# Patient Record
Sex: MALE | Race: WHITE
[De-identification: names, ages, dates, MRNs, and addresses within clinical notes are randomized per-mention and may not be internally consistent; named-entity substitution may affect disease eponyms.]

---

## 2021-01-29 ENCOUNTER — TRANSCRIPTION ENCOUNTER (OUTPATIENT)
Age: 36
End: 2021-01-29

## 2021-01-29 ENCOUNTER — OUTPATIENT (OUTPATIENT)
Dept: OUTPATIENT SERVICES | Facility: HOSPITAL | Age: 36
LOS: 1 days | End: 2021-01-29
Payer: COMMERCIAL

## 2021-01-29 DIAGNOSIS — Z20.828 CONTACT WITH AND (SUSPECTED) EXPOSURE TO OTHER VIRAL COMMUNICABLE DISEASES: ICD-10-CM

## 2021-01-29 LAB — SARS-COV-2 RNA SPEC QL NAA+PROBE: DETECTED

## 2021-01-29 PROCEDURE — C9803: CPT

## 2021-01-29 PROCEDURE — U0003: CPT

## 2021-01-29 PROCEDURE — U0005: CPT

## 2021-01-30 DIAGNOSIS — Z20.828 CONTACT WITH AND (SUSPECTED) EXPOSURE TO OTHER VIRAL COMMUNICABLE DISEASES: ICD-10-CM

## 2021-01-31 ENCOUNTER — EMERGENCY (EMERGENCY)
Facility: HOSPITAL | Age: 36
LOS: 0 days | Discharge: HOME | End: 2021-01-31
Attending: EMERGENCY MEDICINE
Payer: COMMERCIAL

## 2021-01-31 VITALS
DIASTOLIC BLOOD PRESSURE: 88 MMHG | SYSTOLIC BLOOD PRESSURE: 122 MMHG | RESPIRATION RATE: 18 BRPM | HEART RATE: 98 BPM | TEMPERATURE: 99 F | OXYGEN SATURATION: 97 %

## 2021-01-31 VITALS — HEIGHT: 73 IN | WEIGHT: 259.93 LBS

## 2021-01-31 DIAGNOSIS — U07.1 COVID-19: ICD-10-CM

## 2021-01-31 DIAGNOSIS — R06.02 SHORTNESS OF BREATH: ICD-10-CM

## 2021-01-31 DIAGNOSIS — M79.10 MYALGIA, UNSPECIFIED SITE: ICD-10-CM

## 2021-01-31 DIAGNOSIS — R51.9 HEADACHE, UNSPECIFIED: ICD-10-CM

## 2021-01-31 DIAGNOSIS — R50.9 FEVER, UNSPECIFIED: ICD-10-CM

## 2021-01-31 LAB
ALBUMIN SERPL ELPH-MCNC: 4.1 G/DL — SIGNIFICANT CHANGE UP (ref 3.3–5)
ALP SERPL-CCNC: 77 U/L — SIGNIFICANT CHANGE UP (ref 40–120)
ALT FLD-CCNC: 27 U/L — SIGNIFICANT CHANGE UP (ref 12–78)
ANION GAP SERPL CALC-SCNC: 5 MMOL/L — SIGNIFICANT CHANGE UP (ref 5–17)
AST SERPL-CCNC: 14 U/L — LOW (ref 15–37)
BASOPHILS # BLD AUTO: 0 K/UL — SIGNIFICANT CHANGE UP (ref 0–0.2)
BASOPHILS NFR BLD AUTO: 0 % — SIGNIFICANT CHANGE UP (ref 0–2)
BILIRUB SERPL-MCNC: 0.2 MG/DL — SIGNIFICANT CHANGE UP (ref 0.2–1.2)
BUN SERPL-MCNC: 13 MG/DL — SIGNIFICANT CHANGE UP (ref 7–23)
CALCIUM SERPL-MCNC: 9.1 MG/DL — SIGNIFICANT CHANGE UP (ref 8.5–10.1)
CHLORIDE SERPL-SCNC: 106 MMOL/L — SIGNIFICANT CHANGE UP (ref 96–108)
CO2 SERPL-SCNC: 29 MMOL/L — SIGNIFICANT CHANGE UP (ref 22–31)
CREAT SERPL-MCNC: 1.23 MG/DL — SIGNIFICANT CHANGE UP (ref 0.5–1.3)
D DIMER BLD IA.RAPID-MCNC: <150 NG/ML DDU — SIGNIFICANT CHANGE UP
EOSINOPHIL # BLD AUTO: 0 K/UL — SIGNIFICANT CHANGE UP (ref 0–0.5)
EOSINOPHIL NFR BLD AUTO: 0 % — SIGNIFICANT CHANGE UP (ref 0–6)
GLUCOSE SERPL-MCNC: 81 MG/DL — SIGNIFICANT CHANGE UP (ref 70–99)
HCT VFR BLD CALC: 46.5 % — SIGNIFICANT CHANGE UP (ref 39–50)
HGB BLD-MCNC: 15.4 G/DL — SIGNIFICANT CHANGE UP (ref 13–17)
LYMPHOCYTES # BLD AUTO: 1.1 K/UL — SIGNIFICANT CHANGE UP (ref 1–3.3)
LYMPHOCYTES # BLD AUTO: 32 % — SIGNIFICANT CHANGE UP (ref 13–44)
MCHC RBC-ENTMCNC: 29.3 PG — SIGNIFICANT CHANGE UP (ref 27–34)
MCHC RBC-ENTMCNC: 33.1 GM/DL — SIGNIFICANT CHANGE UP (ref 32–36)
MCV RBC AUTO: 88.6 FL — SIGNIFICANT CHANGE UP (ref 80–100)
MONOCYTES # BLD AUTO: 0.41 K/UL — SIGNIFICANT CHANGE UP (ref 0–0.9)
MONOCYTES NFR BLD AUTO: 12 % — SIGNIFICANT CHANGE UP (ref 2–14)
NEUTROPHILS # BLD AUTO: 1.9 K/UL — SIGNIFICANT CHANGE UP (ref 1.8–7.4)
NEUTROPHILS NFR BLD AUTO: 55 % — SIGNIFICANT CHANGE UP (ref 43–77)
NRBC # BLD: SIGNIFICANT CHANGE UP /100 WBCS (ref 0–0)
PLATELET # BLD AUTO: 208 K/UL — SIGNIFICANT CHANGE UP (ref 150–400)
POTASSIUM SERPL-MCNC: 4.2 MMOL/L — SIGNIFICANT CHANGE UP (ref 3.5–5.3)
POTASSIUM SERPL-SCNC: 4.2 MMOL/L — SIGNIFICANT CHANGE UP (ref 3.5–5.3)
PROT SERPL-MCNC: 7.4 GM/DL — SIGNIFICANT CHANGE UP (ref 6–8.3)
RBC # BLD: 5.25 M/UL — SIGNIFICANT CHANGE UP (ref 4.2–5.8)
RBC # FLD: 13.3 % — SIGNIFICANT CHANGE UP (ref 10.3–14.5)
SODIUM SERPL-SCNC: 140 MMOL/L — SIGNIFICANT CHANGE UP (ref 135–145)
TROPONIN I SERPL-MCNC: <0.015 NG/ML — SIGNIFICANT CHANGE UP (ref 0.01–0.04)
WBC # BLD: 3.45 K/UL — LOW (ref 3.8–10.5)
WBC # FLD AUTO: 3.45 K/UL — LOW (ref 3.8–10.5)

## 2021-01-31 PROCEDURE — 36415 COLL VENOUS BLD VENIPUNCTURE: CPT

## 2021-01-31 PROCEDURE — 99283 EMERGENCY DEPT VISIT LOW MDM: CPT

## 2021-01-31 PROCEDURE — 71045 X-RAY EXAM CHEST 1 VIEW: CPT

## 2021-01-31 PROCEDURE — 93005 ELECTROCARDIOGRAM TRACING: CPT

## 2021-01-31 PROCEDURE — 71045 X-RAY EXAM CHEST 1 VIEW: CPT | Mod: 26

## 2021-01-31 PROCEDURE — 80053 COMPREHEN METABOLIC PANEL: CPT

## 2021-01-31 PROCEDURE — 99284 EMERGENCY DEPT VISIT MOD MDM: CPT | Mod: 25

## 2021-01-31 PROCEDURE — 84484 ASSAY OF TROPONIN QUANT: CPT

## 2021-01-31 PROCEDURE — 85379 FIBRIN DEGRADATION QUANT: CPT

## 2021-01-31 PROCEDURE — 93010 ELECTROCARDIOGRAM REPORT: CPT

## 2021-01-31 PROCEDURE — 96374 THER/PROPH/DIAG INJ IV PUSH: CPT

## 2021-01-31 PROCEDURE — 85025 COMPLETE CBC W/AUTO DIFF WBC: CPT

## 2021-01-31 PROCEDURE — 96375 TX/PRO/DX INJ NEW DRUG ADDON: CPT

## 2021-01-31 RX ORDER — SODIUM CHLORIDE 9 MG/ML
500 INJECTION INTRAMUSCULAR; INTRAVENOUS; SUBCUTANEOUS ONCE
Refills: 0 | Status: COMPLETED | OUTPATIENT
Start: 2021-01-31 | End: 2021-01-31

## 2021-01-31 RX ORDER — KETOROLAC TROMETHAMINE 30 MG/ML
30 SYRINGE (ML) INJECTION ONCE
Refills: 0 | Status: DISCONTINUED | OUTPATIENT
Start: 2021-01-31 | End: 2021-01-31

## 2021-01-31 RX ORDER — METOCLOPRAMIDE HCL 10 MG
10 TABLET ORAL ONCE
Refills: 0 | Status: COMPLETED | OUTPATIENT
Start: 2021-01-31 | End: 2021-01-31

## 2021-01-31 RX ADMIN — Medication 30 MILLIGRAM(S): at 16:53

## 2021-01-31 RX ADMIN — Medication 10 MILLIGRAM(S): at 16:55

## 2021-01-31 RX ADMIN — SODIUM CHLORIDE 500 MILLILITER(S): 9 INJECTION INTRAMUSCULAR; INTRAVENOUS; SUBCUTANEOUS at 16:53

## 2021-01-31 NOTE — ED ADULT NURSE NOTE - CHIEF COMPLAINT QUOTE
Has Your Skin Lesion Been Treated?: not been treated
Is This A New Presentation, Or A Follow-Up?: Growth
PT C/O SOB, SEVERE BACK PAIN, MILD CHEST PAIN, FEVER, TESTED POSITIVE FOR COVID 1/29.  89 F.  95%RA

## 2021-01-31 NOTE — ED STATDOCS - PHYSICAL EXAMINATION
Constitutional: NAD AAOx3  Eyes: PERRLA EOMI  Head: Normocephalic atraumatic  Mouth: MMM  Cardiac: tachycardic rate   Resp: Lungs CTAB, ambulatory o2 95% on RA.   GI: Abd s/nt/nd  Neuro: CN2-12 intact, normal strength sensation coordination and gait  Skin: No rashes Constitutional: mild distress AAOx3  Eyes: PERRLA EOMI  Head: Normocephalic atraumatic  Mouth: MMM  Cardiac: tachycardic rate   Resp: Lungs CTAB, ambulatory o2 95% on RA.   GI: Abd s/nt/nd  Neuro: CN2-12 intact, normal strength sensation coordination and gait  Skin: No rashes

## 2021-01-31 NOTE — ED STATDOCS - NS_ ATTENDINGSCRIBEDETAILS _ED_A_ED_FT
I, Brain Feng MD,  performed the initial face to face bedside interview with this patient regarding history of present illness, review of symptoms and relevant past medical, social and family history.  I completed an independent physical examination.  I was the initial provider who evaluated this patient.  The history, relevant review of systems, past medical and surgical history, medical decision making, and physical examination was documented by the scribe in my presence and I attest to the accuracy of the documentation.

## 2021-01-31 NOTE — ED STATDOCS - CLINICAL SUMMARY MEDICAL DECISION MAKING FREE TEXT BOX
35 M COVID + presents with back pain, SOB. pt here is tachycardic and mildly hypoxic. Sx likely related to COVID infection. obtain EKG, CXR, dimer to r/o PE. sx control and reassess.

## 2021-01-31 NOTE — ED ADULT TRIAGE NOTE - CHIEF COMPLAINT QUOTE
PT C/O SOB, SEVERE BACK PAIN, MILD CHEST PAIN, FEVER, TESTED POSITIVE FOR COVID 1/29.  89 F.  95%RA

## 2021-01-31 NOTE — ED STATDOCS - NS ED ROS FT
Constitutional: No fever or chills  Eyes: No visual changes  HEENT: No throat pain  CV: No chest pain  Resp: +SOB no cough  GI: No abd pain, nausea or vomiting  : No dysuria  MSK: +back pain, +myalgia, +arthralgia   Skin: No rash  Neuro: +headache Constitutional: +fever  Eyes: No visual changes  HEENT: No throat pain  CV: No chest pain  Resp: +SOB no cough  GI: No abd pain, nausea or vomiting  : No dysuria  MSK: +back pain, +myalgia, +arthralgia   Skin: No rash  Neuro: +headache

## 2021-01-31 NOTE — ED STATDOCS - CARE PLAN
Principal Discharge DX:	Headache  Secondary Diagnosis:	Shortness of breath  Secondary Diagnosis:	Myalgia  Secondary Diagnosis:	COVID-19

## 2021-01-31 NOTE — ED STATDOCS - NSFOLLOWUPINSTRUCTIONS_ED_ALL_ED_FT
1. return for worsening symptoms or anything concerning to you  2. take all home meds as prescribed  3. follow up with your pmd call to make an appointment  4. see attached instructions for pulseox and covid

## 2021-01-31 NOTE — ED STATDOCS - OBJECTIVE STATEMENT
34 y/o male presents to the ED with SOB, was dx with COVID-19 on 01/29/21 since then with severe myalgia, arthralgia, back pain, SOB, HA. Tried Tylenol without help. +fevers at home to 102F. +loss of smell and taste. No vomiting or diarrhea. Wife also is ill. Sx moderate constant and non radiating.

## 2021-01-31 NOTE — ED STATDOCS - PROGRESS NOTE DETAILS
36 y/o M presents to the ED with SOB, was dx with COVID-19 on 01/29/21 since then with myalgias, back pain, SOB, HA. Tried Tylenol w/o relief, pt reports fevers at home tmax 102F, loss of smell and taste. No vomiting or diarrhea. Sx moderate constant and non radiating.  PE: lungs CTA b/l, heart RRR s1/s2, abd soft, non-tender, non-distended, pharynx w/o erythema or exudates O2 sat 95%  Plan: EKG, CXR, labs, dimer, reassess  Fiona Barrera PA-C pt feeling better. trop dimer xray unremarkable. pt ambulatory o2 96% RA. will d/c with follow up and strict return precautions.  Brain Feng M.D., Attending Physician

## 2021-01-31 NOTE — ED ADULT NURSE NOTE - OBJECTIVE STATEMENT
Pt c/o SOB, back pain, headache,  intermittent mild CP.  Pt dx with COVID-19 on 01/29/21. Pt c/o worsening myalgia, pt report loss of smell and taste. Pt denies N/V/D. No diaphoresis noted. No respiratory distress.

## 2021-01-31 NOTE — ED STATDOCS - PATIENT PORTAL LINK FT
You can access the FollowMyHealth Patient Portal offered by [facility] by registering at the following website: [email or web address]. By joining [employer]’s FollowMyHealth portal, you will also be able to view your health information using other applications (apps) compatible with our system.

## 2021-02-01 ENCOUNTER — TRANSCRIPTION ENCOUNTER (OUTPATIENT)
Age: 36
End: 2021-02-01

## 2021-02-02 ENCOUNTER — TRANSCRIPTION ENCOUNTER (OUTPATIENT)
Age: 36
End: 2021-02-02

## 2021-02-03 ENCOUNTER — TRANSCRIPTION ENCOUNTER (OUTPATIENT)
Age: 36
End: 2021-02-03

## 2021-02-13 ENCOUNTER — OUTPATIENT (OUTPATIENT)
Dept: OUTPATIENT SERVICES | Facility: HOSPITAL | Age: 36
LOS: 1 days | End: 2021-02-13
Payer: COMMERCIAL

## 2021-02-13 DIAGNOSIS — Z20.828 CONTACT WITH AND (SUSPECTED) EXPOSURE TO OTHER VIRAL COMMUNICABLE DISEASES: ICD-10-CM

## 2021-02-13 LAB — SARS-COV-2 RNA SPEC QL NAA+PROBE: DETECTED

## 2021-02-13 PROCEDURE — U0005: CPT

## 2021-02-13 PROCEDURE — U0003: CPT

## 2021-02-13 PROCEDURE — C9803: CPT

## 2021-02-14 DIAGNOSIS — Z20.828 CONTACT WITH AND (SUSPECTED) EXPOSURE TO OTHER VIRAL COMMUNICABLE DISEASES: ICD-10-CM

## 2021-04-30 ENCOUNTER — OUTPATIENT (OUTPATIENT)
Dept: OUTPATIENT SERVICES | Facility: HOSPITAL | Age: 36
LOS: 1 days | End: 2021-04-30
Payer: COMMERCIAL

## 2021-04-30 DIAGNOSIS — Z20.828 CONTACT WITH AND (SUSPECTED) EXPOSURE TO OTHER VIRAL COMMUNICABLE DISEASES: ICD-10-CM

## 2021-04-30 LAB — SARS-COV-2 RNA SPEC QL NAA+PROBE: SIGNIFICANT CHANGE UP

## 2021-04-30 PROCEDURE — C9803: CPT

## 2021-04-30 PROCEDURE — U0005: CPT

## 2021-04-30 PROCEDURE — U0003: CPT

## 2021-05-01 DIAGNOSIS — Z20.828 CONTACT WITH AND (SUSPECTED) EXPOSURE TO OTHER VIRAL COMMUNICABLE DISEASES: ICD-10-CM

## 2021-05-10 ENCOUNTER — OUTPATIENT (OUTPATIENT)
Dept: OUTPATIENT SERVICES | Facility: HOSPITAL | Age: 36
LOS: 1 days | End: 2021-05-10
Payer: COMMERCIAL

## 2021-05-10 DIAGNOSIS — Z20.828 CONTACT WITH AND (SUSPECTED) EXPOSURE TO OTHER VIRAL COMMUNICABLE DISEASES: ICD-10-CM

## 2021-05-10 LAB — SARS-COV-2 RNA SPEC QL NAA+PROBE: SIGNIFICANT CHANGE UP

## 2021-05-10 PROCEDURE — C9803: CPT

## 2021-05-10 PROCEDURE — U0003: CPT

## 2021-05-10 PROCEDURE — U0005: CPT

## 2021-05-11 DIAGNOSIS — Z20.828 CONTACT WITH AND (SUSPECTED) EXPOSURE TO OTHER VIRAL COMMUNICABLE DISEASES: ICD-10-CM

## 2021-05-21 ENCOUNTER — OUTPATIENT (OUTPATIENT)
Dept: OUTPATIENT SERVICES | Facility: HOSPITAL | Age: 36
LOS: 1 days | End: 2021-05-21
Payer: COMMERCIAL

## 2021-05-21 DIAGNOSIS — Z20.828 CONTACT WITH AND (SUSPECTED) EXPOSURE TO OTHER VIRAL COMMUNICABLE DISEASES: ICD-10-CM

## 2021-05-21 LAB — SARS-COV-2 RNA SPEC QL NAA+PROBE: SIGNIFICANT CHANGE UP

## 2021-05-21 PROCEDURE — C9803: CPT

## 2021-05-21 PROCEDURE — U0005: CPT

## 2021-05-21 PROCEDURE — U0003: CPT

## 2021-05-22 DIAGNOSIS — Z20.828 CONTACT WITH AND (SUSPECTED) EXPOSURE TO OTHER VIRAL COMMUNICABLE DISEASES: ICD-10-CM

## 2021-05-24 ENCOUNTER — RESULT REVIEW (OUTPATIENT)
Age: 36
End: 2021-05-24

## 2021-07-27 ENCOUNTER — EMERGENCY (EMERGENCY)
Facility: HOSPITAL | Age: 36
LOS: 0 days | Discharge: HOME | End: 2021-07-27
Attending: EMERGENCY MEDICINE
Payer: COMMERCIAL

## 2021-07-27 VITALS
HEART RATE: 89 BPM | TEMPERATURE: 98 F | DIASTOLIC BLOOD PRESSURE: 93 MMHG | SYSTOLIC BLOOD PRESSURE: 128 MMHG | RESPIRATION RATE: 16 BRPM | OXYGEN SATURATION: 99 %

## 2021-07-27 VITALS — WEIGHT: 255.07 LBS | HEIGHT: 73 IN

## 2021-07-27 DIAGNOSIS — E86.0 DEHYDRATION: ICD-10-CM

## 2021-07-27 DIAGNOSIS — F45.21 HYPOCHONDRIASIS: ICD-10-CM

## 2021-07-27 LAB
ADD ON TEST-SPECIMEN IN LAB: SIGNIFICANT CHANGE UP
ALBUMIN SERPL ELPH-MCNC: 5.4 G/DL — HIGH (ref 3.3–5)
ALP SERPL-CCNC: 85 U/L — SIGNIFICANT CHANGE UP (ref 40–120)
ALT FLD-CCNC: 32 U/L — SIGNIFICANT CHANGE UP (ref 12–78)
ANION GAP SERPL CALC-SCNC: 7 MMOL/L — SIGNIFICANT CHANGE UP (ref 5–17)
AST SERPL-CCNC: 15 U/L — SIGNIFICANT CHANGE UP (ref 15–37)
BILIRUB SERPL-MCNC: 0.6 MG/DL — SIGNIFICANT CHANGE UP (ref 0.2–1.2)
BUN SERPL-MCNC: 24 MG/DL — HIGH (ref 7–23)
CALCIUM SERPL-MCNC: 10 MG/DL — SIGNIFICANT CHANGE UP (ref 8.5–10.1)
CHLORIDE SERPL-SCNC: 98 MMOL/L — SIGNIFICANT CHANGE UP (ref 96–108)
CO2 SERPL-SCNC: 27 MMOL/L — SIGNIFICANT CHANGE UP (ref 22–31)
CREAT SERPL-MCNC: 1.59 MG/DL — HIGH (ref 0.5–1.3)
GLUCOSE SERPL-MCNC: 94 MG/DL — SIGNIFICANT CHANGE UP (ref 70–99)
POTASSIUM SERPL-MCNC: 4.1 MMOL/L — SIGNIFICANT CHANGE UP (ref 3.5–5.3)
POTASSIUM SERPL-SCNC: 4.1 MMOL/L — SIGNIFICANT CHANGE UP (ref 3.5–5.3)
PROT SERPL-MCNC: 8.8 GM/DL — HIGH (ref 6–8.3)
SODIUM SERPL-SCNC: 132 MMOL/L — LOW (ref 135–145)

## 2021-07-27 PROCEDURE — 99283 EMERGENCY DEPT VISIT LOW MDM: CPT

## 2021-07-27 PROCEDURE — 99284 EMERGENCY DEPT VISIT MOD MDM: CPT

## 2021-07-27 PROCEDURE — 36415 COLL VENOUS BLD VENIPUNCTURE: CPT

## 2021-07-27 PROCEDURE — 82550 ASSAY OF CK (CPK): CPT

## 2021-07-27 PROCEDURE — 80053 COMPREHEN METABOLIC PANEL: CPT

## 2021-07-27 RX ORDER — SODIUM CHLORIDE 9 MG/ML
2000 INJECTION INTRAMUSCULAR; INTRAVENOUS; SUBCUTANEOUS ONCE
Refills: 0 | Status: COMPLETED | OUTPATIENT
Start: 2021-07-27 | End: 2021-07-27

## 2021-07-27 RX ADMIN — SODIUM CHLORIDE 4000 MILLILITER(S): 9 INJECTION INTRAMUSCULAR; INTRAVENOUS; SUBCUTANEOUS at 17:16

## 2021-07-27 NOTE — ED STATDOCS - PROGRESS NOTE DETAILS
pt with symptoms resolvedmainor Da Silva DO signed Anay Horvath PA-C Pt seen initially in intake by Dr. Da Silva.   35M with PM hyperhidrosis c/o extreme weakness today. 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 pt feeling much better after fluids in ED. Labs notable for mild hyponatremia and elevated renal function. Pt to f/u with PMD Lisa to repeat labs. return precautions given. Pt feeling well at DC, agrees with DC and plan of care.

## 2021-07-27 NOTE — ED STATDOCS - CPE ED EYE NORM
Josette care of patient awake and alert in bed. Denies any bleeding, leakage of fluid, or decreased fetal movement. PIV to L hand in fusing LR and Pitocin as ordered. No s/s of infiltration or infection noted. See flowsheet for charting. Will call Dr. Billie Gonzales for further orders. bilateral normal...

## 2021-07-27 NOTE — ED STATDOCS - ATTENDING CONTRIBUTION TO CARE
Dr. Da Silva: I performed a face to face bedside interview with patient regarding history of present illness, review of symptoms and past medical history. I completed an independent physical exam.  I have discussed patient's plan of care with PA.   I agree with note as stated above, having amended the EMR as needed to reflect my findings.   This includes HISTORY OF PRESENT ILLNESS, HIV, PAST MEDICAL/SURGICAL/FAMILY/SOCIAL HISTORY, ALLERGIES AND HOME MEDICATIONS, REVIEW OF SYSTEMS, PHYSICAL EXAM, and any PROGRESS NOTES during the time I functioned as the attending physician for this patient.

## 2021-07-27 NOTE — ED ADULT NURSE NOTE - OBJECTIVE STATEMENT
Patient presents to ED complaining of dehydration. Patient states he was at work became dizzy, patient states whole body is intermittently craqmping. 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 Patient denies dizziness on arrival, denies CP, SOB, NVD, HA

## 2021-07-27 NOTE — ED STATDOCS - OBJECTIVE STATEMENT
31 y/o M with a PMHx of hypochondriasis presents to the ED c/o dehydration. 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 Pt drank water and Gatorade but is very dehydrated and experiencing cramping.

## 2022-01-09 ENCOUNTER — OUTPATIENT (OUTPATIENT)
Dept: OUTPATIENT SERVICES | Facility: HOSPITAL | Age: 37
LOS: 1 days | End: 2022-01-09
Payer: COMMERCIAL

## 2022-01-09 DIAGNOSIS — Z20.828 CONTACT WITH AND (SUSPECTED) EXPOSURE TO OTHER VIRAL COMMUNICABLE DISEASES: ICD-10-CM

## 2022-01-09 LAB — SARS-COV-2 RNA SPEC QL NAA+PROBE: SIGNIFICANT CHANGE UP

## 2022-01-09 PROCEDURE — U0005: CPT

## 2022-01-09 PROCEDURE — U0003: CPT

## 2022-01-09 PROCEDURE — C9803: CPT

## 2022-01-10 DIAGNOSIS — Z20.828 CONTACT WITH AND (SUSPECTED) EXPOSURE TO OTHER VIRAL COMMUNICABLE DISEASES: ICD-10-CM

## 2022-12-01 ENCOUNTER — APPOINTMENT (OUTPATIENT)
Dept: NEUROLOGY | Facility: CLINIC | Age: 37
End: 2022-12-01

## 2023-01-03 NOTE — ED ADULT TRIAGE NOTE - BSA (M2)
NOTIFICATION OF INPATIENT ADMISSION   AUTHORIZATION REQUEST   SERVICING FACILITY:   [address]  Tax ID: [id number]  NPI: [id number]   ATTENDING PROVIDER:  Attending Name and NPI#: Shiv Foss Do [[id number]]  Address: [address] [place] [address] [place]  [address]  Phone: [phone]     ADMISSION INFORMATION:  Place of Service: Inpatient [address] [facility]  [address]  Place of Service Code: 21  Inpatient Admission Date/Time: 12/31/22  2:57 PM  Discharge Date/Time: No discharge date for patient encounter  Admitting Diagnosis Code/Description:  UTI (urinary tract infection) [N39 0]  Sepsis ([address] ) [A41 9]  AMS (altered mental status) [R41 82]     UTILIZATION REVIEW CONTACT:  [facility] [place] Utilization   Network Utilization Review Department  Phone: [phone]  Fax: [phone]  Email: [email or web address]  Contact for approvals/pending authorizations, clinical reviews, and discharge  PHYSICIAN ADVISORY SERVICES:  Medical Necessity Denial & Rm[facility]io-[facility] Review  Phone: [phone]  Fax: [phone]  Email: [email or web address]           Initial Clinical Review    Admission: Date/Time/Statement:   Admission Orders (From admission, onward)     Ordered        12/31/22 Ziyad Adam  Once                      Orders Placed This Encounter   Procedures   • INPATIENT ADMISSION     Standing Status:   Standing     Number of Occurrences:   1     Order Specific Question:   Level of Care     Answer:   Med Surg [16]     Order Specific Question:   Estimated length of stay     Answer:   More than 2 Midnights     Order Specific Question:   Certification     Answer:   I certify that inpatient services are medically necessary for this patient for a duration of greater than two midnights  See H&P and MD Progress Notes for additional information about the patient's course of treatment       ED Arrival Information     Expected   - Arrival   12/31/2022 11:42    Acuity   Emergent            Means of arrival   Ambulance    Escorted by   [facility]    Service   Hospitalist    Admission type   Emergency            Arrival complaint   EMS/AMS           Chief Complaint   Patient presents with   • Altered Mental Status     Per EMS, caregiver states more confused than normal today, smells like UTI  Febrile  Pt has no complaints except headache  Left sided weakness and speech at baseline       Initial Presentation: 67 y o  male PMH Parkinson's disease, neuropathy, with left-sided weakness, BPH, urinary incontinence, hypertension, hyperlipidemia, coronary artery disease, type 2 diabetes to ED by EMS presents w AMS  Wife reports incr sleepiness, less interactive w mild HA, Temp 99 5F    EXAM  Febrile, tachycardia; reports dysuria, mild ABD pain; oriented x3; labs leukocytosis, elevated LA, abn UA, CT reveals renal 11 mm indeterminate left upper pole hypodensity, colonic diverticulosis  Intertrigo at groin  Given 2 L IVF bolus     Inpatient admission due to Urosepsis, toxic metabolic encephalopathy  Cont gentle IV hydration, IV antibx, follow blood/ urine CXs  Avoid hallucinogenic agents; fall/ delirium precautions  Nystatin powder to groin, serial exam  Cont home meds  Date:  1/1/2023   Day 2:   Attending MD  Encephalopathy resolved   Cont antibx, follow urine/ blood CXs, AM CBC, PT/OT eval , SSI  ED Triage Vitals [12/31/22 1147]   Temperature Pulse Respirations Blood Pressure SpO2   98 9 °F (37 2 °C) (!) 108 16 133/59 99 %      Temp Source Heart Rate Source Patient Position - Orthostatic VS BP Location FiO2 (%)   Oral Monitor Lying Right arm --      Pain Score       No Pain          Wt Readings from Last 1 Encounters:   01/03/23 85 9 kg (189 lb 6 oz)     Additional Vital Signs:   Date/Time Temp Pulse Resp BP MAP (mmHg) SpO2 O2 Device Patient Position - Orthostatic VS   01/01/23 14:50:18 99 2 °F (37 3 °C) 89 -- 97/51 66 95 % -- --   01/01/23 09:40:20 -- 97 20 115/64 81 95 % -- --   01/01/23 08:03:02 99 1 °F (37 3 °C) 98 16 102/67 79 93 % -- --   12/31/22 2215 -- -- -- 168/78 -- -- -- Lying   12/31/22 22:12:11 99 1 °F (37 3 °C) 118 Abnormal  18 165/127 Abnormal  140 94 % None (Room air) Lying   12/31/22 2009 -- -- -- -- -- -- None (Room air) --   12/31/22 17:37:20 98 2 °F (36 8 °C) 87 16 131/71 91 96 % None (Room air) Lying   12/31/22 1630 -- 83 18 108/53 76 94 % None (Room air) Lying   12/31/22 1545 100 °F (37 8 °C) 91 -- 115/59 83 96 % -- --   12/31/22 1530 -- 89 -- 111/53 77 96 % -- --   12/31/22 1500 -- 91 -- 103/54 74 95 % -- --   12/31/22 1445 -- 94 -- 103/53 74 96 % -- --   12/31/22 1430 -- 91 -- 97/54 73 95 % -- --   12/31/22 1415 -- 97 -- 97/54 72 96 % -- --   12/31/22 1345 -- 103 -- -- -- 96 % None (Room air) --   12/31/22 1330 -- 102 -- 132/60 87 97 % None (Room air) --   12/31/22 1315 -- 104 -- 138/62 89 96 % -- --   12/31/22 1300 -- 105 18 160/61 101 98 % -- --   12/31/22 1245 -- 107 Abnormal  -- -- -- 98 % -- --   12/31/22 1203 101 4 °F (38 6 °C) Abnormal  -- -- -- -- -- -- --   12/31/22 1200 -- 103 23 Abnormal  139/65 -- 96 % None (Room air) --   12/31/22 1147 98 9 °F (37 2 °C) 108 Abnormal  16 133/59 -- 99 % None (Room air) Lying     Weights (last 14 days)    Date/Time Weight Weight Method   01/01/23 0548 81 4 kg (179 lb 7 3 oz) Bed scale   12/31/22 1831 81 3 kg (179 lb 3 7 oz) Bed scale   12/31/22 17:37:20 81 3 kg (179 lb 3 7 oz) Bed scale   12/31/22 1200 76 8 kg (169 lb 5 oz) Bed scale   12/31/22 1147 76 4 kg (168 lb 6 9 oz) Stretcher scale     Trauma Secondary Assessment - Perry Coma Scale    Date and Time Eye Opening Best Verbal Response Best Motor Response Perry Coma Scale Score   01/01/23 0402 4 5 6 15   12/31/22 2009 4 5 6 15   12/31/22 1223 4 5 6 15       Pertinent Labs/Diagnostic Test Results:   CT abdomen pelvis with contrast   Final Result by Andres Peck DO (12/31 2849)      1  No acute intra-abdominal abnormality        2  11 mm indeterminate left upper pole hypodensity versus volume averaging with prominent renal pyramid  Follow-up CT abdomen with renal protocol recommended in 6 months to reevaluate this finding  Small bilateral simple cysts and nonobstructing left    renal calculi noted  3  Colonic diverticulosis without evidence of diverticulitis  Additional incidental findings as above          Results from last 7 days   Lab Units 12/31/22  1215   SARS-COV-2  Negative     Results from last 7 days   Lab Units 01/02/23  0534 01/01/23  0509 12/31/22 2002 12/31/22  1214   WBC Thousand/uL 8 30 9 44  --  13 58*   HEMOGLOBIN g/dL 9 0* 10 1*  --  12 1   HEMATOCRIT % 28 0* 31 3*  --  36 9   PLATELETS Thousands/uL 181 211 206 241   NEUTROS ABS Thousands/µL  --  8 17*  --  12 37*         Results from last 7 days   Lab Units 01/02/23  0534 01/01/23  0509 12/31/22  1214   SODIUM mmol/L 137 136 136   POTASSIUM mmol/L 3 8 3 8 4 2   CHLORIDE mmol/L 107 103 100   CO2 mmol/L 25 24 24   ANION GAP mmol/L 5 9 12   BUN mg/dL 19 19 23   CREATININE mg/dL 1 33* 1 30 1 29   EGFR ml/min/1 73sq m 53 54 55   CALCIUM mg/dL 7 7* 8 0* 9 0   MAGNESIUM mg/dL  --  1 3*  --      Results from last 7 days   Lab Units 01/01/23  0509 12/31/22  1214   AST U/L 15 12*   ALT U/L 5* 3*   ALK PHOS U/L 64 80   TOTAL PROTEIN g/dL 5 7* 6 7   ALBUMIN g/dL 3 2* 3 8   TOTAL BILIRUBIN mg/dL 1 09* 1 20*     Results from last 7 days   Lab Units 01/03/23  0747 01/02/23  2120 01/02/23  1617 01/02/23  1158 01/02/23  0752 01/01/23  2050 01/01/23  1546 01/01/23  1138 01/01/23  0801 12/31/22  2152   POC GLUCOSE mg/dl 174* 197* 202* 237* 152* 261* 174* 233* 143* 213*     Results from last 7 days   Lab Units 01/02/23  0534 01/01/23  0509 12/31/22  1214   GLUCOSE RANDOM mg/dL 170* 159* 156*             No results found for: BETA-HYDROXYBUTYRATE                   Results from last 7 days   Lab Units 12/31/22  1643 12/31/22  1406 12/31/22  1214   HS TNI 0HR ng/L  --   --  12   HS TNI 2HR ng/L  --  8  --    HSTNI D2 ng/L  --  -4  --    HS TNI 4HR ng/L 13  --   --    HSTNI D4 ng/L 1  --   --          Results from last 7 days   Lab Units 12/31/22  1214   PROTIME seconds 14 0   INR  1 05   PTT seconds 28     Results from last 7 days   Lab Units 12/31/22 2002   TSH 3RD GENERATON uIU/mL 0 720     Results from last 7 days   Lab Units 01/01/23  0509 12/31/22  1214   PROCALCITONIN ng/ml 0 23 0 19     Results from last 7 days   Lab Units 12/31/22  1534 12/31/22  1214   LACTIC ACID mmol/L 1 4 2 7*                                         Results from last 7 days   Lab Units 12/31/22  1217   CLARITY UA  Turbid   COLOR UA  Light Yellow   SPEC GRAV UA  1 017   PH UA  5 0   GLUCOSE UA mg/dl Negative   KETONES UA mg/dl Negative   BLOOD UA  Large*   PROTEIN UA mg/dl Trace*   NITRITE UA  Negative   BILIRUBIN UA  Negative   UROBILINOGEN UA (BE) mg/dl <2 0   LEUKOCYTES UA  Large*   WBC UA /hpf Innumerable*   RBC UA /hpf 30-50*   BACTERIA UA /hpf None Seen   EPITHELIAL CELLS WET PREP /hpf Occasional     Results from last 7 days   Lab Units 12/31/22  1215   INFLUENZA A PCR  Negative   INFLUENZA B PCR  Negative   RSV PCR  Negative                             Results from last 7 days   Lab Units 12/31/22  1229 12/31/22  1217 12/31/22  1214   BLOOD CULTURE  No Growth at 48 hrs  --  No Growth at 48 hrs     URINE CULTURE   --  70,000-79,000 cfu/ml Staphylococcus aureus*  10,000-19,000 cfu/ml  --        ED Treatment:   Medication Administration from 12/31/2022 1142 to 12/31/2022 1731       Date/Time Order Dose Route Action     12/31/2022 1239 EST acetaminophen (TYLENOL) tablet 975 mg 975 mg Oral Given     12/31/2022 1215 EST sodium chloride 0 9 % bolus 1,000 mL 1,000 mL Intravenous New Bag     12/31/2022 1319 EST ceftriaxone (ROCEPHIN) 1 g/50 mL in dextrose IVPB 1,000 mg Intravenous New Bag     12/31/2022 1321 EST sodium chloride 0 9 % bolus 1,000 mL 1,000 mL Intravenous New Bag        Past Medical History: Diagnosis Date   • Ambulatory dysfunction     uses walker with assist of 1   • Anemia    • Anxiety    • At risk for falls     hx of falls   • Benign paroxysmal vertigo     unspecified ear   • BPH (benign prostatic hyperplasia)     for TURP today 1/4/2021   • Calculus in bladder     for surgical removal today 1/4/2021   • Cataract     left eye   • Cervicalgia    • Chest pain    • Chronic kidney disease     stage 3   • Constipation    • CTS (carpal tunnel syndrome)     left   • Cyst of pancreas    • Cystitis 06/23/2020    acute cystitis with hematuria   • Depression    • Diabetes mellitus ([place] [address] )     type II with neuropathy   • Dizziness     and giddiness   • Dysphagia    • Elevated PSA    • Facial weakness    • GERD (gastroesophageal reflux disease)     last assessed 5/20/16   • Gross hematuria    • Hematuria    • Hyperlipidemia    • Hypertension    • Kidney disease    • Kidney stone    • Left-sided weakness    • Long term (current) use of oral hypoglycemic drugs    • Muscle weakness    • Nuclear senile cataract of left eye    • OA (osteoarthritis) of knee     b/l   • Paralytic syndrome (HCC)    • Syncope and collapse    • Tachycardia    • UTI (urinary tract infection)    • Vertebro-basilar artery syndrome    • Vitamin B deficiency    • Vitamin D deficiency    • Vitamin D deficiency      Present on Admission:  • Stage 3 chronic kidney disease ([place] [address] )  • Type 2 diabetes mellitus with diabetic neuropathy (HCC)  • Left-sided weakness      Admitting Diagnosis: UTI (urinary tract infection) [N39 0]  Sepsis ([place] [address] ) [A41 9]  AMS (altered mental status) [R41 82]  Age/Sex: 67 y o  male  Admission Orders:  Daily wt  I/O  SCD  Scheduled Medications:  aspirin, 81 mg, Oral, Daily  atorvastatin, 40 mg, Oral, Daily  carbidopa-levodopa, 1 tablet, Oral, TID  cefTRIAXone, 1,000 mg, Intravenous, Q24H  clopidogrel, 75 mg, Oral, Daily  cyanocobalamin, 1,000 mcg, Oral, Daily  docusate sodium, 100 mg, Oral, BID  finasteride, 5 mg, Oral, Daily  glycerin-hypromellose-, 1 drop, Both Eyes, BID  heparin (porcine), 5,000 Units, Subcutaneous, Q8H LIYAH  insulin lispro, 1-6 Units, Subcutaneous, HS  insulin lispro, 1-6 Units, Subcutaneous, TID AC  lisinopril, 5 mg, Oral, Daily  melatonin, 3 mg, Oral, HS  metoprolol succinate, 25 mg, Oral, Daily  nystatin, , Topical, BID  pantoprazole, 40 mg, Oral, Early Morning  ranolazine, 500 mg, Oral, Q12H LIYAH  tamsulosin, 0 4 mg, Oral, Daily With Dinner      Continuous IV Infusions:  sodium chloride, 75 mL/hr, Intravenous, Continuous      PRN Meds:  acetaminophen, 650 mg, Oral, Q6H PRN            Network Utilization Review Department  ATTENTION: Please call with any questions or concerns to [phone] and carefully listen to the prompts so that you are directed to the right person  All voicemails are confidential   [facility] all requests for admission clinical reviews, approved or denied determinations and any other requests to dedicated fax number below belonging to the campus where the patient is receiving treatment   List of dedicated fax numbers for the Facilities:  [address] DENIALS (Administrative/Medical Necessity) [phone]   [address] (Maternity/NICU/Pediatrics) [phone]   [address] Lindsey Simons [phone]   [facility] [address] [phone]   [address] [place] [address] Kenyon Wagner 28 [phone]   [facility]6 [facility] Kathleen Ponce [facility] 134 [address] [facility] [phone] 2.41

## 2023-01-11 ENCOUNTER — APPOINTMENT (OUTPATIENT)
Dept: NEUROLOGY | Facility: CLINIC | Age: 38
End: 2023-01-11

## 2023-01-24 ENCOUNTER — APPOINTMENT (OUTPATIENT)
Dept: NEUROLOGY | Facility: CLINIC | Age: 38
End: 2023-01-24

## 2023-02-15 ENCOUNTER — APPOINTMENT (OUTPATIENT)
Dept: NEUROLOGY | Facility: CLINIC | Age: 38
End: 2023-02-15

## 2023-11-25 ENCOUNTER — EMERGENCY (EMERGENCY)
Facility: HOSPITAL | Age: 38
LOS: 0 days | Discharge: HOME | End: 2023-11-25
Attending: STUDENT IN AN ORGANIZED HEALTH CARE EDUCATION/TRAINING PROGRAM
Payer: COMMERCIAL

## 2023-11-25 VITALS — HEIGHT: 73 IN | WEIGHT: 250 LBS

## 2023-11-25 VITALS
OXYGEN SATURATION: 99 % | RESPIRATION RATE: 18 BRPM | SYSTOLIC BLOOD PRESSURE: 135 MMHG | HEART RATE: 69 BPM | DIASTOLIC BLOOD PRESSURE: 87 MMHG | TEMPERATURE: 98 F

## 2023-11-25 DIAGNOSIS — M54.2 CERVICALGIA: ICD-10-CM

## 2023-11-25 PROCEDURE — 99284 EMERGENCY DEPT VISIT MOD MDM: CPT

## 2023-11-25 PROCEDURE — 99283 EMERGENCY DEPT VISIT LOW MDM: CPT

## 2023-11-25 RX ORDER — DIAZEPAM 5 MG
2 TABLET ORAL
Qty: 2 | Refills: 0
Start: 2023-11-25 | End: 2023-11-25

## 2023-11-25 RX ORDER — LIDOCAINE 4 G/100G
1 CREAM TOPICAL ONCE
Refills: 0 | Status: COMPLETED | OUTPATIENT
Start: 2023-11-25 | End: 2023-11-25

## 2023-11-25 RX ORDER — IBUPROFEN 200 MG
600 TABLET ORAL ONCE
Refills: 0 | Status: COMPLETED | OUTPATIENT
Start: 2023-11-25 | End: 2023-11-25

## 2023-11-25 RX ADMIN — LIDOCAINE 1 PATCH: 4 CREAM TOPICAL at 07:02

## 2023-11-25 RX ADMIN — Medication 600 MILLIGRAM(S): at 07:03

## 2023-11-25 NOTE — ED PROVIDER NOTE - NSFOLLOWUPINSTRUCTIONS_ED_ALL_ED_FT
** Ibuprofen 600mg every 6 hours, take with food.   ** Salonpas patches, heat, massage may also help.     ** Follow up with your primary care doctor in the next 72 hours.     ** Go to the nearest Emergency Department if you experience any new or concerning symptoms, such as:   - worsening pain, pain in the middle of your neck, numbness or weakness in your arms and legs.   - unable to move or feel part of your body  - fever, chills  - severe headache

## 2023-11-25 NOTE — ED PROVIDER NOTE - PATIENT PORTAL LINK FT
You can access the FollowMyHealth Patient Portal offered by [facility] by registering at the following website: [email or web address]. By joining [employer]’s FollowMyHealth portal, you will also be able to view your health information using other applications (apps) compatible with our system.

## 2023-11-25 NOTE — ED PROVIDER NOTE - OBJECTIVE STATEMENT
37-year-old male no segment past medical history presents with neck pain.  Several days ago pain was on the right paraspinal neck, yesterday got better and then became left paraspinal neck.  Patient denies trauma, instrumentation, fevers chills, headache, lower extremity weakness, arm weakness, loss of bowel or bladder control.

## 2023-11-25 NOTE — ED ADULT NURSE NOTE - CHIEF COMPLAINT QUOTE
Pt ambulatory to ED c/o neck pain x1 week. 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 NKDA.

## 2023-11-25 NOTE — ED PROVIDER NOTE - CLINICAL SUMMARY MEDICAL DECISION MAKING FREE TEXT BOX
37-year-old male no segment past medical history presents with neck pain.  Several days ago pain was on the right paraspinal neck, yesterday got better and then became left paraspinal neck.  Patient denies trauma, instrumentation, fevers chills, headache, lower extremity weakness, arm weakness, loss of bowel or bladder control.  On exam, tenderness to palpation in the left paraspinal neck.  No midline tenderness of the cervical, thoracic, lumbar spine.  Patient ambulatory with no lower extremity weakness.    Differential includes, but not limited to: Torticollis, muscle spasm, very low suspicion for cord compression, C-spine injury.  Reassurance analgesia DC.

## 2023-11-25 NOTE — ED PROVIDER NOTE - PHYSICAL EXAMINATION
Gen: Well appearing in NAD   Head: NC/AT  Neck: trachea midline  Resp:  No distress  Ext: no deformities  Neck: + tenderness to palpation in the left paraspinal neck.  No midline tenderness of the cervical, thoracic, lumbar spine.  Patient ambulatory with no lower extremity weakness.  Neuro:  A&O appears non focal  Skin:  Warm and dry as visualized  Psych:  Normal affect and mood

## 2024-03-18 NOTE — ED ADULT NURSE NOTE - MODE OF DISCHARGE
CARE PROVIDERS:  Accepting Physician: Hill Brizuela  Administration: Joey Walsh  Administration: Stephany Mcgee  Administration: Venu Estrada  Administration: Luis Carlos Main  Administration: Garland Ko  Admitting: Hill Brizuela  Attending: Hill Brizuela  Case Management: Ashley Owen  Consultant: Ryan Romeo  Consultant: Juvenitno Soto  Consultant: Weil, Patricia  Consultant: Jennifer Gudino  Consultant: Tai Sweeney  Consultant: Shai Rothman  Covering Team: Jeancarlos Young  ED ACP: Prachi Preciado  ED Attending: Ginger Bui ED Nurse: Emmett Drake  Nurse: Vaishali Carrasco  Ordered: ADM, User  Ordered: Doctor, Unknown  Outpatient Provider: Grisel Murray  Override: Vaishali Carrasco  Override: Alice Hanson  PCA/Nursing Assistant: Sri Arango  PCA/Nursing Assistant: Kristina Bradley  Registered Dietitian: Kari Chou  : Zulema Liz   Ambulatory